# Patient Record
Sex: MALE | Race: WHITE | NOT HISPANIC OR LATINO | Employment: UNEMPLOYED | URBAN - METROPOLITAN AREA
[De-identification: names, ages, dates, MRNs, and addresses within clinical notes are randomized per-mention and may not be internally consistent; named-entity substitution may affect disease eponyms.]

---

## 2022-10-19 PROBLEM — R35.1 NOCTURIA: Status: ACTIVE | Noted: 2022-10-19

## 2022-10-19 PROBLEM — Z80.42 FAMILY HISTORY OF PROSTATE CANCER: Status: ACTIVE | Noted: 2022-10-19

## 2022-10-19 PROBLEM — R31.29 MICROHEMATURIA: Status: ACTIVE | Noted: 2022-10-19

## 2023-06-08 PROBLEM — R35.1 NOCTURIA: Status: RESOLVED | Noted: 2022-10-19 | Resolved: 2023-06-08

## 2023-06-08 PROBLEM — I10 PRIMARY HYPERTENSION: Status: ACTIVE | Noted: 2023-06-08

## 2023-06-08 PROBLEM — I73.9 PERIPHERAL VASCULAR DISEASE: Status: ACTIVE | Noted: 2023-06-08

## 2023-06-08 PROBLEM — R73.03 PREDIABETES: Status: ACTIVE | Noted: 2023-06-08

## 2023-06-08 PROBLEM — R31.29 MICROHEMATURIA: Status: RESOLVED | Noted: 2022-10-19 | Resolved: 2023-06-08

## 2023-09-14 PROBLEM — R94.39 ABNORMAL STRESS TEST: Status: ACTIVE | Noted: 2023-09-14

## 2023-09-14 PROBLEM — I25.119 ATHEROSCLEROSIS OF NATIVE CORONARY ARTERY OF NATIVE HEART WITH ANGINA PECTORIS: Status: ACTIVE | Noted: 2023-09-14

## 2023-09-14 PROBLEM — Z87.891 HISTORY OF TOBACCO USE: Status: ACTIVE | Noted: 2023-09-14

## 2023-09-14 PROBLEM — Z98.890 H/O MAJOR ABDOMINAL SURGERY: Status: ACTIVE | Noted: 2023-09-14

## 2023-09-14 PROBLEM — E78.2 MIXED HYPERLIPIDEMIA: Status: ACTIVE | Noted: 2023-09-14

## 2024-02-06 ENCOUNTER — TELEPHONE (OUTPATIENT)
Dept: ADMINISTRATIVE | Facility: HOSPITAL | Age: 64
End: 2024-02-06

## 2024-02-06 NOTE — TELEPHONE ENCOUNTER
Patient on list for overdue colonoscopy screening, spoke with patient, patient stated, he was not interest in screening, he had other things going on right now